# Patient Record
Sex: MALE | Race: WHITE | NOT HISPANIC OR LATINO | Employment: OTHER | URBAN - METROPOLITAN AREA
[De-identification: names, ages, dates, MRNs, and addresses within clinical notes are randomized per-mention and may not be internally consistent; named-entity substitution may affect disease eponyms.]

---

## 2018-11-06 ENCOUNTER — OFFICE VISIT - HEALTHEAST (OUTPATIENT)
Dept: GERIATRICS | Facility: CLINIC | Age: 78
End: 2018-11-06

## 2018-11-06 DIAGNOSIS — I10 ESSENTIAL HYPERTENSION: ICD-10-CM

## 2018-11-06 DIAGNOSIS — E78.5 HYPERLIPIDEMIA: ICD-10-CM

## 2018-11-06 DIAGNOSIS — D50.0 BLOOD LOSS ANEMIA: ICD-10-CM

## 2018-11-06 DIAGNOSIS — Z96.652 STATUS POST TOTAL LEFT KNEE REPLACEMENT: ICD-10-CM

## 2018-11-06 DIAGNOSIS — I25.10 ATHEROSCLEROTIC CARDIOVASCULAR DISEASE: ICD-10-CM

## 2018-11-06 DIAGNOSIS — F41.9 ANXIETY: ICD-10-CM

## 2018-11-07 ENCOUNTER — OFFICE VISIT - HEALTHEAST (OUTPATIENT)
Dept: GERIATRICS | Facility: CLINIC | Age: 78
End: 2018-11-07

## 2018-11-07 ENCOUNTER — RECORDS - HEALTHEAST (OUTPATIENT)
Dept: LAB | Facility: CLINIC | Age: 78
End: 2018-11-07

## 2018-11-07 DIAGNOSIS — D50.0 BLOOD LOSS ANEMIA: ICD-10-CM

## 2018-11-07 DIAGNOSIS — I95.9 HYPOTENSION: ICD-10-CM

## 2018-11-07 DIAGNOSIS — E78.5 HYPERLIPIDEMIA: ICD-10-CM

## 2018-11-07 DIAGNOSIS — I10 ESSENTIAL HYPERTENSION: ICD-10-CM

## 2018-11-07 DIAGNOSIS — Z96.652 STATUS POST TOTAL LEFT KNEE REPLACEMENT: ICD-10-CM

## 2018-11-07 LAB — HGB BLD-MCNC: 13.6 G/DL (ref 14–18)

## 2018-11-08 ENCOUNTER — AMBULATORY - HEALTHEAST (OUTPATIENT)
Dept: ADMINISTRATIVE | Facility: CLINIC | Age: 78
End: 2018-11-08

## 2018-11-08 RX ORDER — OXYCODONE HYDROCHLORIDE 5 MG/1
5 TABLET ORAL EVERY 8 HOURS PRN
Status: SHIPPED | COMMUNITY
Start: 2018-11-08

## 2018-11-08 RX ORDER — PRAVASTATIN SODIUM 40 MG
20 TABLET ORAL AT BEDTIME
Status: SHIPPED | COMMUNITY
Start: 2018-11-08

## 2018-11-08 RX ORDER — BISACODYL 10 MG
10 SUPPOSITORY, RECTAL RECTAL DAILY PRN
Status: SHIPPED | COMMUNITY
Start: 2018-11-08

## 2018-11-08 RX ORDER — ACETAMINOPHEN 325 MG/1
650 TABLET ORAL EVERY 4 HOURS PRN
Status: SHIPPED | COMMUNITY
Start: 2018-11-19

## 2018-11-08 RX ORDER — OMEPRAZOLE 20 MG/1
20 TABLET, DELAYED RELEASE ORAL
Status: SHIPPED | COMMUNITY
Start: 2018-11-08

## 2018-11-08 RX ORDER — METOPROLOL TARTRATE 25 MG/1
12.5 TABLET, FILM COATED ORAL 2 TIMES DAILY
Status: SHIPPED | COMMUNITY
Start: 2018-11-08

## 2018-11-08 RX ORDER — AMOXICILLIN 250 MG
2 CAPSULE ORAL 2 TIMES DAILY
Status: SHIPPED | COMMUNITY
Start: 2018-11-08

## 2018-11-10 ENCOUNTER — OFFICE VISIT - HEALTHEAST (OUTPATIENT)
Dept: GERIATRICS | Facility: CLINIC | Age: 78
End: 2018-11-10

## 2018-11-10 DIAGNOSIS — R60.0 PEDAL EDEMA: ICD-10-CM

## 2018-11-10 DIAGNOSIS — F32.A DEPRESSION: ICD-10-CM

## 2018-11-10 DIAGNOSIS — N40.0 BPH (BENIGN PROSTATIC HYPERPLASIA): ICD-10-CM

## 2018-11-10 DIAGNOSIS — M15.9 OSTEOARTHRITIS, GENERALIZED: ICD-10-CM

## 2018-11-10 DIAGNOSIS — I25.10 CORONARY ARTERY DISEASE: ICD-10-CM

## 2018-11-10 DIAGNOSIS — I95.1 ORTHOSTATIC HYPOTENSION: ICD-10-CM

## 2018-11-10 DIAGNOSIS — G47.30 SLEEP APNEA: ICD-10-CM

## 2018-11-10 DIAGNOSIS — Z96.652 STATUS POST TOTAL LEFT KNEE REPLACEMENT: ICD-10-CM

## 2018-11-10 ASSESSMENT — MIFFLIN-ST. JEOR: SCORE: 1294.59

## 2018-11-12 ENCOUNTER — RECORDS - HEALTHEAST (OUTPATIENT)
Dept: LAB | Facility: CLINIC | Age: 78
End: 2018-11-12

## 2018-11-12 LAB
ANION GAP SERPL CALCULATED.3IONS-SCNC: 9 MMOL/L (ref 5–18)
BASOPHILS # BLD AUTO: 0.1 THOU/UL (ref 0–0.2)
BASOPHILS NFR BLD AUTO: 1 % (ref 0–2)
BUN SERPL-MCNC: 13 MG/DL (ref 8–28)
CALCIUM SERPL-MCNC: 9.6 MG/DL (ref 8.5–10.5)
CHLORIDE BLD-SCNC: 93 MMOL/L (ref 98–107)
CO2 SERPL-SCNC: 28 MMOL/L (ref 22–31)
CREAT SERPL-MCNC: 0.71 MG/DL (ref 0.7–1.3)
EOSINOPHIL # BLD AUTO: 0.5 THOU/UL (ref 0–0.4)
EOSINOPHIL NFR BLD AUTO: 6 % (ref 0–6)
ERYTHROCYTE [DISTWIDTH] IN BLOOD BY AUTOMATED COUNT: 13.1 % (ref 11–14.5)
GFR SERPL CREATININE-BSD FRML MDRD: >60 ML/MIN/1.73M2
GLUCOSE BLD-MCNC: 98 MG/DL (ref 70–125)
HCT VFR BLD AUTO: 39.7 % (ref 40–54)
HGB BLD-MCNC: 13.1 G/DL (ref 14–18)
LYMPHOCYTES # BLD AUTO: 2.4 THOU/UL (ref 0.8–4.4)
LYMPHOCYTES NFR BLD AUTO: 28 % (ref 20–40)
MCH RBC QN AUTO: 31.3 PG (ref 27–34)
MCHC RBC AUTO-ENTMCNC: 33 G/DL (ref 32–36)
MCV RBC AUTO: 95 FL (ref 80–100)
MONOCYTES # BLD AUTO: 1 THOU/UL (ref 0–0.9)
MONOCYTES NFR BLD AUTO: 11 % (ref 2–10)
NEUTROPHILS # BLD AUTO: 4.7 THOU/UL (ref 2–7.7)
NEUTROPHILS NFR BLD AUTO: 55 % (ref 50–70)
PLATELET # BLD AUTO: 402 THOU/UL (ref 140–440)
PMV BLD AUTO: 10 FL (ref 8.5–12.5)
POTASSIUM BLD-SCNC: 4.4 MMOL/L (ref 3.5–5)
RBC # BLD AUTO: 4.19 MILL/UL (ref 4.4–6.2)
SODIUM SERPL-SCNC: 130 MMOL/L (ref 136–145)
TSH SERPL DL<=0.005 MIU/L-ACNC: 3.21 UIU/ML (ref 0.3–5)
WBC: 8.7 THOU/UL (ref 4–11)

## 2018-11-13 ENCOUNTER — OFFICE VISIT - HEALTHEAST (OUTPATIENT)
Dept: GERIATRICS | Facility: CLINIC | Age: 78
End: 2018-11-13

## 2018-11-13 DIAGNOSIS — K59.00 CONSTIPATION IN MALE: ICD-10-CM

## 2018-11-13 DIAGNOSIS — Z96.652 STATUS POST TOTAL LEFT KNEE REPLACEMENT: ICD-10-CM

## 2018-11-13 DIAGNOSIS — I10 ESSENTIAL HYPERTENSION: ICD-10-CM

## 2018-11-13 DIAGNOSIS — K21.9 GASTROESOPHAGEAL REFLUX DISEASE, ESOPHAGITIS PRESENCE NOT SPECIFIED: ICD-10-CM

## 2018-11-13 RX ORDER — CHLORAL HYDRATE 500 MG
2 CAPSULE ORAL DAILY
Status: SHIPPED | COMMUNITY
Start: 2018-11-13

## 2018-11-13 RX ORDER — MULTIPLE VITAMINS W/ MINERALS TAB 9MG-400MCG
1 TAB ORAL DAILY
Status: SHIPPED | COMMUNITY
Start: 2018-11-13

## 2018-11-14 ENCOUNTER — RECORDS - HEALTHEAST (OUTPATIENT)
Dept: LAB | Facility: CLINIC | Age: 78
End: 2018-11-14

## 2018-11-14 ENCOUNTER — OFFICE VISIT - HEALTHEAST (OUTPATIENT)
Dept: GERIATRICS | Facility: CLINIC | Age: 78
End: 2018-11-14

## 2018-11-14 DIAGNOSIS — E87.1 HYPONATREMIA: ICD-10-CM

## 2018-11-14 DIAGNOSIS — M79.605 LEFT LEG PAIN: ICD-10-CM

## 2018-11-14 LAB
ANION GAP SERPL CALCULATED.3IONS-SCNC: 9 MMOL/L (ref 5–18)
BUN SERPL-MCNC: 12 MG/DL (ref 8–28)
CALCIUM SERPL-MCNC: 9.6 MG/DL (ref 8.5–10.5)
CHLORIDE BLD-SCNC: 91 MMOL/L (ref 98–107)
CO2 SERPL-SCNC: 27 MMOL/L (ref 22–31)
CREAT SERPL-MCNC: 0.69 MG/DL (ref 0.7–1.3)
GFR SERPL CREATININE-BSD FRML MDRD: >60 ML/MIN/1.73M2
GLUCOSE BLD-MCNC: 105 MG/DL (ref 70–125)
POTASSIUM BLD-SCNC: 4.4 MMOL/L (ref 3.5–5)
SODIUM SERPL-SCNC: 127 MMOL/L (ref 136–145)

## 2018-11-15 ENCOUNTER — RECORDS - HEALTHEAST (OUTPATIENT)
Dept: LAB | Facility: CLINIC | Age: 78
End: 2018-11-15

## 2018-11-15 LAB
ANION GAP SERPL CALCULATED.3IONS-SCNC: 8 MMOL/L (ref 5–18)
BASOPHILS # BLD AUTO: 0.1 THOU/UL (ref 0–0.2)
BASOPHILS NFR BLD AUTO: 1 % (ref 0–2)
BUN SERPL-MCNC: 14 MG/DL (ref 8–28)
CALCIUM SERPL-MCNC: 9.8 MG/DL (ref 8.5–10.5)
CHLORIDE BLD-SCNC: 91 MMOL/L (ref 98–107)
CO2 SERPL-SCNC: 28 MMOL/L (ref 22–31)
CREAT SERPL-MCNC: 0.73 MG/DL (ref 0.7–1.3)
EOSINOPHIL # BLD AUTO: 0.3 THOU/UL (ref 0–0.4)
EOSINOPHIL NFR BLD AUTO: 4 % (ref 0–6)
ERYTHROCYTE [DISTWIDTH] IN BLOOD BY AUTOMATED COUNT: 13.2 % (ref 11–14.5)
GFR SERPL CREATININE-BSD FRML MDRD: >60 ML/MIN/1.73M2
GLUCOSE BLD-MCNC: 66 MG/DL (ref 70–125)
HCT VFR BLD AUTO: 37.5 % (ref 40–54)
HGB BLD-MCNC: 12.9 G/DL (ref 14–18)
LYMPHOCYTES # BLD AUTO: 1.5 THOU/UL (ref 0.8–4.4)
LYMPHOCYTES NFR BLD AUTO: 20 % (ref 20–40)
MCH RBC QN AUTO: 32.6 PG (ref 27–34)
MCHC RBC AUTO-ENTMCNC: 34.4 G/DL (ref 32–36)
MCV RBC AUTO: 95 FL (ref 80–100)
MONOCYTES # BLD AUTO: 1 THOU/UL (ref 0–0.9)
MONOCYTES NFR BLD AUTO: 13 % (ref 2–10)
NEUTROPHILS # BLD AUTO: 4.6 THOU/UL (ref 2–7.7)
NEUTROPHILS NFR BLD AUTO: 62 % (ref 50–70)
OSMOLALITY SERPL: 266 MOSM/KG (ref 270–300)
OSMOLALITY UR: 183 MOSM/KG (ref 300–900)
PLATELET # BLD AUTO: 438 THOU/UL (ref 140–440)
PMV BLD AUTO: 9.5 FL (ref 8.5–12.5)
POTASSIUM BLD-SCNC: 4.2 MMOL/L (ref 3.5–5)
RBC # BLD AUTO: 3.96 MILL/UL (ref 4.4–6.2)
SODIUM SERPL-SCNC: 127 MMOL/L (ref 136–145)
SODIUM UR-SCNC: 39 MMOL/L
WBC: 7.5 THOU/UL (ref 4–11)

## 2018-11-16 LAB
ANION GAP SERPL CALCULATED.3IONS-SCNC: 8 MMOL/L (ref 5–18)
BUN SERPL-MCNC: 11 MG/DL (ref 8–28)
CALCIUM SERPL-MCNC: 9.4 MG/DL (ref 8.5–10.5)
CHLORIDE BLD-SCNC: 94 MMOL/L (ref 98–107)
CO2 SERPL-SCNC: 27 MMOL/L (ref 22–31)
CREAT SERPL-MCNC: 0.7 MG/DL (ref 0.7–1.3)
GFR SERPL CREATININE-BSD FRML MDRD: >60 ML/MIN/1.73M2
GLUCOSE BLD-MCNC: 99 MG/DL (ref 70–125)
POTASSIUM BLD-SCNC: 4.1 MMOL/L (ref 3.5–5)
SODIUM SERPL-SCNC: 129 MMOL/L (ref 136–145)

## 2018-11-20 ENCOUNTER — OFFICE VISIT - HEALTHEAST (OUTPATIENT)
Dept: GERIATRICS | Facility: CLINIC | Age: 78
End: 2018-11-20

## 2018-11-20 DIAGNOSIS — I10 ESSENTIAL HYPERTENSION: ICD-10-CM

## 2018-11-20 DIAGNOSIS — K21.9 GASTROESOPHAGEAL REFLUX DISEASE, ESOPHAGITIS PRESENCE NOT SPECIFIED: ICD-10-CM

## 2018-11-20 DIAGNOSIS — Z96.652 STATUS POST TOTAL LEFT KNEE REPLACEMENT: ICD-10-CM

## 2018-11-20 DIAGNOSIS — F41.9 ANXIETY: ICD-10-CM

## 2018-11-20 RX ORDER — SODIUM CHLORIDE 1 G/1
1 TABLET ORAL 2 TIMES DAILY
Status: SHIPPED | COMMUNITY
Start: 2018-11-20

## 2018-11-21 ENCOUNTER — AMBULATORY - HEALTHEAST (OUTPATIENT)
Dept: GERIATRICS | Facility: CLINIC | Age: 78
End: 2018-11-21

## 2021-06-02 VITALS — WEIGHT: 138 LBS | HEIGHT: 67 IN | BODY MASS INDEX: 21.66 KG/M2

## 2021-06-02 VITALS — BODY MASS INDEX: 21.61 KG/M2 | WEIGHT: 138 LBS

## 2021-06-02 VITALS — WEIGHT: 138 LBS

## 2021-06-02 VITALS — WEIGHT: 138 LBS | BODY MASS INDEX: 21.61 KG/M2

## 2021-06-16 PROBLEM — R80.9 PROTEINURIA: Status: ACTIVE | Noted: 2018-11-06

## 2021-06-16 PROBLEM — J32.9 CHRONIC SINUSITIS: Status: ACTIVE | Noted: 2018-11-06

## 2021-06-16 PROBLEM — N40.1 BPH WITH OBSTRUCTION/LOWER URINARY TRACT SYMPTOMS: Status: ACTIVE | Noted: 2018-11-06

## 2021-06-16 PROBLEM — N13.8 BPH WITH OBSTRUCTION/LOWER URINARY TRACT SYMPTOMS: Status: ACTIVE | Noted: 2018-11-06

## 2021-06-16 PROBLEM — M48.07 SPINAL STENOSIS OF LUMBOSACRAL REGION: Status: ACTIVE | Noted: 2018-11-06

## 2021-06-16 PROBLEM — M54.50 LOW BACK PAIN: Status: ACTIVE | Noted: 2018-11-06

## 2021-06-16 PROBLEM — I69.314 FRONTAL LOBE AND EXECUTIVE FUNCTION DEFICIT FOLLOWING CEREBRAL INFARCTION: Status: ACTIVE | Noted: 2018-11-06

## 2021-06-16 PROBLEM — I10 ESSENTIAL HYPERTENSION: Status: ACTIVE | Noted: 2018-11-06

## 2021-06-16 PROBLEM — F41.9 ANXIETY: Status: ACTIVE | Noted: 2018-11-06

## 2021-06-16 PROBLEM — I25.10 ATHEROSCLEROTIC CARDIOVASCULAR DISEASE: Status: ACTIVE | Noted: 2018-11-06

## 2021-06-16 PROBLEM — Z96.652 STATUS POST TOTAL LEFT KNEE REPLACEMENT: Status: ACTIVE | Noted: 2018-11-06

## 2021-06-16 PROBLEM — Q39.6 DIVERTICULUM OF ESOPHAGUS: Status: ACTIVE | Noted: 2018-11-06

## 2021-06-16 PROBLEM — D50.0 BLOOD LOSS ANEMIA: Status: ACTIVE | Noted: 2018-11-06

## 2021-06-16 PROBLEM — K21.9 GERD (GASTROESOPHAGEAL REFLUX DISEASE): Status: ACTIVE | Noted: 2018-11-06

## 2021-06-16 PROBLEM — E78.5 HYPERLIPIDEMIA: Status: ACTIVE | Noted: 2018-11-06

## 2021-06-21 NOTE — PROGRESS NOTES
[facility]    Facility:   [facility] [[id number]]   Code Status: FULL CODE      CHIEF COMPLAINT/REASON FOR VISIT:  Chief Complaint   Patient presents with     Review Of Multiple Medical Conditions       HISTORY:      HPI: Jared is a 78 y.o. male undergoing physical and occupational therapy at [facility].  He is  with a past medical history for HLD, HTN, BPH, GERD, CAD, CVA, OA recently admitted to the [facility] 11/1 to 11/5/2018 for a planned L. TKA due to  OA and persistent pain with ambulation.    He did have some blood loss anemia and Hgb stabilized to 11.8 on 11/4.  and today 11/6/18 he was 13.6. He was discharged on apixaban until POD 5 then to start ASA 81mg x 9 additional days.  He will follow up with Orthopedics on 11/14/18    Today he was seen as a follow up after a 2 day hospital stay at [facility]. He was recently sent to the ER for reports of feeling  dizzy and fuzzy in the head. He also experienced a low BP.  Today he reports feeling much better. He denied CP or shortness of breath. His labs were reviewed and TSH within normal limits. He did report feeling very cold.. He was noted to have a low NA of 130 and this  will be rechecked.  During his hospitalization his metoprolol was decreased to 12.5 two times a day.  He had a positive D- dimer but CTA was negative. It is reported he undergo a heart monitor if symptoms persist. His main concern today  was no BM x 3 days. He reports he is  starting to having abdominal discomfort. Abdomen was soft and he is passing flatus, also BS x4.  He was placed on bowel medications.     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  Social History     Socioeconomic History     Marital status:      Spouse name: Not on file     Number of children: Not on file     Years of education: Not on file     Highest education level: Not on file   Social Needs     Financial resource strain: Not on file     Food insecurity - worry: Not on file     Food insecurity - inability: Not on file     Transportation needs - medical: Not on file     Transportation needs - non-medical: Not on file   Occupational History     Not on file   Tobacco Use     Smoking status: Not on file   Substance and Sexual Activity     Alcohol use: Not on file     Drug use: Not on file     Sexual activity: Not on file   Other Topics Concern     Not on file   Social History Narrative     Not on file         Review of Systems   Constitutional: Positive for activity change and fatigue. Negative for appetite change, chills and fever.   HENT: Negative for congestion and sore throat.    Eyes: Negative for visual disturbance.   Respiratory: Negative for cough, shortness of breath and wheezing.    Cardiovascular: Negative for chest pain and leg swelling.   Gastrointestinal: Positive for constipation. Negative for abdominal distention, abdominal pain and nausea.   Genitourinary: Negative for dysuria.   Musculoskeletal: Positive for arthralgias. Negative for myalgias.   Skin: Positive for wound. Negative for color change and rash.   Neurological: Negative for weakness and numbness.   Psychiatric/Behavioral: Negative for agitation, behavioral problems and sleep disturbance.       .  Vitals:    11/13/18 1128   BP: 140/63   Pulse: 67   Resp: 18   Temp: 97.6  F (36.4  C)   SpO2: 97%   Weight: 138 lb (62.6 kg)        Physical Exam   Constitutional: He is oriented to person, place, and time. He appears well-developed and well-nourished.   HENT:   Head: Normocephalic.   Eyes: Conjunctivae are normal.   Neck: Normal range of motion.   Cardiovascular: Normal rate, regular rhythm and normal heart sounds.   No murmur heard.  Regular and 67   Pulmonary/Chest: Breath sounds normal. No respiratory distress. He has no wheezes. He has no rales.   Abdominal: Soft. Bowel sounds are normal. He exhibits no distension. There is no tenderness.   Musculoskeletal: Normal range of motion. He exhibits no edema.   Neurological: He is alert and oriented to person, place, and time.   Skin: Skin is warm and dry.   Left knee wound- covered with dressing - no surrounding redness or drainage- follow up with Orthopedics on 11/14/18    Psychiatric: He has a normal mood and affect. His behavior is normal.         LABS:   Recent Results (from the past 240 hour(s))   Hemoglobin   Result Value Ref Range    Hemoglobin 13.6 (L) 14.0 - 18.0 g/dL   Basic Metabolic Panel   Result Value Ref Range    Sodium 130 (L) 136 - 145 mmol/L    Potassium 4.4 3.5 - 5.0 mmol/L    Chloride 93 (L) 98 - 107 mmol/L    CO2 28 22 - 31 mmol/L    Anion Gap, Calculation 9 5 - 18 mmol/L    Glucose 98 70 - 125 mg/dL    Calcium 9.6 8.5 - 10.5 mg/dL    BUN 13 8 - 28 mg/dL    Creatinine 0.71 0.70 - 1.30 mg/dL    GFR MDRD Af Amer >60 >60 mL/min/1.73m2    GFR MDRD Non Af Amer >60 >60 mL/min/1.73m2   Thyroid Stimulating Hormone (TSH)   Result Value Ref Range    TSH 3.21 0.30 - 5.00 uIU/mL   HM1 (CBC with Diff)   Result Value Ref Range    WBC 8.7 4.0 - 11.0 thou/uL    RBC 4.19 (L) 4.40 - 6.20 mill/uL    Hemoglobin 13.1 (L) 14.0 - 18.0 g/dL    Hematocrit 39.7 (L) 40.0 - 54.0 %    MCV 95 80 - 100 fL    MCH 31.3 27.0 - 34.0 pg    MCHC 33.0 32.0 - 36.0 g/dL    RDW 13.1 11.0 - 14.5 %    Platelets 402 140 - 440 thou/uL    MPV 10.0 8.5 - 12.5 fL    Neutrophils % 55 50 - 70 %    Lymphocytes % 28 20 -  40 %    Monocytes % 11 (H) 2 - 10 %    Eosinophils % 6 0 - 6 %    Basophils % 1 0 - 2 %    Neutrophils Absolute 4.7 2.0 - 7.7 thou/uL    Lymphocytes Absolute 2.4 0.8 - 4.4 thou/uL    Monocytes Absolute 1.0 (H) 0.0 - 0.9 thou/uL    Eosinophils Absolute 0.5 (H) 0.0 - 0.4 thou/uL    Basophils Absolute 0.1 0.0 - 0.2 thou/uL     Current Outpatient Medications   Medication Sig     [START ON 11/19/2018] acetaminophen (TYLENOL) 325 MG tablet Take 650 mg by mouth every 6 (six) hours as needed for pain.     acetaminophen (TYLENOL) 500 MG tablet Take 1,000 mg by mouth 3 (three) times a day.     aspirin 81 MG EC tablet Take 162 mg by mouth daily.     [START ON 11/16/2018] aspirin 81 MG EC tablet Take 81 mg by mouth daily.     b complex vitamins tablet Take 1 tablet by mouth daily.     bisacodyl (DULCOLAX) 10 mg suppository Insert 10 mg into the rectum daily as needed.     cholecalciferol, vitamin D3, 1,000 unit tablet Take 1,000 Units by mouth daily.     co-enzyme Q-10 30 mg capsule Take 30 mg by mouth 3 (three) times a day.     cranberry 400 mg cap Take 400 mg by mouth daily.     garlic 100 mg Tab Take 100 mg by mouth.     metoprolol tartrate (LOPRESSOR) 25 MG tablet Take 12.5 mg by mouth 2 (two) times a day hold for HR <60 &bp 110 SBP<60 DBP .           multivitamin with minerals (THERA-M) 9 mg iron-400 mcg Tab tablet Take 1 tablet by mouth daily.     naloxone (NARCAN) 4 mg/actuation nasal spray 4 mg by Intranasal route as needed for opioid reversal. 1 spray (4 mg dose) into one nostril for opioid reversal. Call 911. May repeat if no response in 3 minutes.     omega-3/dha/epa/fish oil (FISH OIL-OMEGA-3 FATTY ACIDS) 300-1,000 mg capsule Take 2 g by mouth daily.     omeprazole (PRILOSEC OTC) 20 MG tablet Take 20 mg by mouth daily before breakfast.     oxyCODONE (ROXICODONE) 5 MG immediate release tablet Take 2.5-5 mg by mouth every 8 (eight) hours as needed for pain .           pravastatin (PRAVACHOL) 40 MG tablet Take 20 mg by  mouth at bedtime .           selenium 50 mcg Tab Take 100 mcg by mouth daily with lunch.     senna-docusate (PERICOLACE) 8.6-50 mg tablet Take 2 tablets by mouth 2 (two) times a day.     UNABLE TO FIND Med Name:glucosamine 1500  1 tablet daily .     UNABLE TO FIND Med Name:hyaluric acid capsule 400-80-40 One time daily .     UNABLE TO FIND Med Name:Tumeric 500 mg Daily .     UNABLE TO FIND Med Name:MSM tablet  Po daily 1000mg. (methysulfonymethaqne) .       ASSESSMENT:      ICD-10-CM    1. Essential hypertension I10    2. Gastroesophageal reflux disease, esophagitis presence not specified K21.9    3. Status post total left knee replacement Z96.652    4. Constipation in male K59.00        PLAN:    Anemia Last HGB 13.1  HTN -BP  Stable metoprolol decreased to 12.5 two times a day in the hospital  CAD- on pravastatin   Physical deconditioning - PT/OT  S/P Left TKA- follow up tomorrow with Orthopedics, monitor for S/S infection   Recommended heart monitor if symptoms persist.   Constipation- bowel medications as ordered.     Electronically signed by: Polina Mcdonald, CNP

## 2021-06-21 NOTE — PROGRESS NOTES
"[facility]    Facility:   [facility] [[id number]]   Code Status: FULL CODE      CHIEF COMPLAINT/REASON FOR VISIT:  Chief Complaint   Patient presents with     Problem Visit       HISTORY:      HPI: Jared is a 78 y.o. male undergoing physical and occupational therapy at [facility].  He is  with a past medical history for HLD, HTN, BPH, GERD, CAD, CVA, OA recently admitted to the [facility] 11/1 to 11/5/2018 for a planned L. TKA due to  OA and persistent pain with ambulation.    He did have some blood loss anemia and Hgb stabilized to 11.8 on 11/4.  and today 11/6/18 he was 13.6. He was discharged on apixaban until POD 5 then to start ASA 81mg x 9 additional days.  He is to follow up with ortho for staple removal in 2 weeks.     Today he was seen in the dining room as the nurse was assessing him. He reported feeling dizzy and fuzzy in the head. He denied CP but reported his chest did not feel right. He reported it \"feels slow\" His sats were  Between 87-88% on RA. . His BP the nurse took with machine was 102/34. I checked him manually and was 88/38 with an apical pulse of 58. He also reported feeling cold. He was wheeled to his room and sent to the ER for further evaluation. Upon the paramedics assessment his BP was 120/80 but he did fluctuate on his oxygen. He was placed on 2L NC. He also reported having diarrhea x 2 days. He was noted to have a fading bruise left lateral eye and tells me he bumped it a few days ago but could not give further details.      No past medical history on file.          No family history on file.  Social History     Social History     Marital status:      Spouse name: N/A     Number of children: N/A     Years of education: N/A     Social History Main Topics     Smoking status: Not on file     Smokeless tobacco: Not on file     Alcohol use Not on file     Drug use: Not on file     Sexual activity: Not on file     Other Topics " Concern     Not on file     Social History Narrative         Review of Systems   Constitutional: Positive for activity change and fatigue. Negative for appetite change, chills and fever.   HENT: Negative for congestion and sore throat.    Eyes: Negative for visual disturbance.   Respiratory: Negative for cough, shortness of breath and wheezing.    Cardiovascular: Positive for palpitations. Negative for chest pain and leg swelling.   Gastrointestinal: Positive for diarrhea. Negative for abdominal distention, abdominal pain, constipation and nausea.   Genitourinary: Negative for dysuria.   Musculoskeletal: Positive for arthralgias. Negative for myalgias.   Skin: Positive for wound. Negative for color change and rash.   Neurological: Positive for dizziness and light-headedness. Negative for weakness and numbness.   Psychiatric/Behavioral: Negative for agitation, behavioral problems and sleep disturbance.       .  Vitals:    11/07/18 0944   BP: 137/67   Pulse: 65   Resp: 18   Temp: (!) 96.4  F (35.8  C)   SpO2: 98%   Weight: 138 lb (62.6 kg)       Physical Exam   Constitutional: He is oriented to person, place, and time. He appears well-developed and well-nourished.   HENT:   Head: Normocephalic.   Eyes: Conjunctivae are normal.   Neck: Normal range of motion.   Cardiovascular: Normal rate, regular rhythm and normal heart sounds.    No murmur heard.  Irregularly irregular with a pulse of 58   Pulmonary/Chest: Breath sounds normal. No respiratory distress. He has no wheezes. He has no rales.   Abdominal: Soft. Bowel sounds are normal. He exhibits no distension. There is no tenderness.   Musculoskeletal: Normal range of motion. He exhibits no edema.   Neurological: He is alert and oriented to person, place, and time.   Skin: Skin is warm and dry.   Left knee wound- not assessed during this time.    Psychiatric: He has a normal mood and affect. His behavior is normal.         LABS:   Recent Results (from the past 240  hour(s))   Hemoglobin   Result Value Ref Range    Hemoglobin 13.6 (L) 14.0 - 18.0 g/dL     No current outpatient prescriptions on file.       ASSESSMENT:      ICD-10-CM    1. Status post total left knee replacement Z96.652    2. Hyperlipidemia E78.5    3. Hypotension I95.9    4. Essential hypertension I10    5. Blood loss anemia D50.0        PLAN:    Anemia HGB 11/7/18 -13.6  HTN BP low metoprolol with hold parameters.   CAD- on pravastatin   Send to the ER for further evaluation    Electronically signed by: Polina Mcdonald CNP

## 2021-06-21 NOTE — PROGRESS NOTES
[facility]    Facility:   [facility] [[id number]]   Code Status: FULL CODE  PCP: No Primary Care Provider   Phone: None   Fax: None      CHIEF COMPLAINT/REASON FOR VISIT:  Chief Complaint   Patient presents with     Discharge Summary       HISTORY COURSE:   Jared is a 78 y.o. male undergoing physical and occupational therapy at [facility].  He is  with a past medical history for HLD, HTN, BPH, GERD, CAD, CVA, OA recently admitted to the [facility] 11/1 to 11/5/2018 for a planned L. TKA due to  OA and persistent pain with ambulation.    He did have some blood loss anemia and Hgb stabilized to 11.8 on 11/4.  and  11/6/18 he was 13.6. He was discharged on apixaban until POD 5 then to start ASA 81mg x 9 additional days.  He followed up with Orthopedics on 11/14/18     Today he was seen as a follow up for a routine medical visit and a face to face for discharge. He was recently sent to the ER for reports of feeling  dizzy and fuzzy in the head. He also experienced a low BP.  Today he reports feeling much better. He denied CP or shortness of breath. His labs were reviewed and TSH within normal limits. . He was noted to have a low NA of 130. Last reading 129 on 11/16/18. Pt recently started on Na chloride tablets two times a day. Orders written to follow up with PMD for hyponatremia. He denied muscle cramps, fatigue or HA     During his hospitalization his metoprolol was decreased to 12.5 two times a day.  He had a positive D- dimer but CTA was negative. It is reported he undergo a heart monitor if symptoms persist. He reports he now moving his bowels. He declined home care care services and will do outpatient therapy      Review of Systems  Constitutional: Positive for activity change and fatigue. Negative for appetite change, chills and fever.   HENT: Negative for congestion and sore throat.    Eyes: Negative for visual disturbance.   Respiratory: Negative for cough,  shortness of breath and wheezing.    Cardiovascular: Negative for chest pain and leg swelling.   Gastrointestinal: negative for constipation. Negative for abdominal distention, abdominal pain and nausea.   Genitourinary: Negative for dysuria.   Musculoskeletal: Positive for arthralgias. Negative for myalgias.   Skin: Positive for wound. Negative for color change and rash.   Neurological: Negative for weakness and numbness.   Psychiatric/Behavioral: Negative for agitation, behavioral problems and sleep disturbance.       Vitals:    11/20/18 1054   BP: 127/69   Pulse: 76   Resp: 20   Temp: (!) 96.3  F (35.7  C)   SpO2: 98%   Weight: 138 lb (62.6 kg)       Physical Exam  Constitutional: He is oriented to person, place, and time. He appears well-developed and well-nourished.   HENT:   Head: Normocephalic.   Eyes: Conjunctivae are normal.   Neck: Normal range of motion.   Cardiovascular: Normal rate, regular rhythm and normal heart sounds.   No murmur heard.  Regular    Pulmonary/Chest: Breath sounds normal. No respiratory distress. He has no wheezes. He has no rales.   Abdominal: Soft. Bowel sounds are normal. He exhibits no distension. There is no tenderness.   Musculoskeletal: Normal range of motion. He exhibits no edema.   Neurological: He is alert and oriented to person, place, and time.   Skin: Skin is warm and dry.   Left knee wound- steri strips - no surrounding redness or drainage- pt seen by ortho on 11/14. No lower extremity edema.   Psychiatric: He has a normal mood and affect. His behavior is normal.       MEDICATION LIST:  Current Outpatient Medications   Medication Sig     acetaminophen (TYLENOL) 325 MG tablet Take 650 mg by mouth every 4 (four) hours as needed for pain .           aspirin 81 MG EC tablet Take 81 mg by mouth daily.     b complex vitamins tablet Take 1 tablet by mouth daily.     bisacodyl (DULCOLAX) 10 mg suppository Insert 10 mg into the rectum daily as needed.     cholecalciferol, vitamin  D3, 1,000 unit tablet Take 1,000 Units by mouth daily.     co-enzyme Q-10 30 mg capsule Take 30 mg by mouth 3 (three) times a day.     cranberry 400 mg cap Take 400 mg by mouth daily.     garlic 100 mg Tab Take 100 mg by mouth.     metoprolol tartrate (LOPRESSOR) 25 MG tablet Take 12.5 mg by mouth 2 (two) times a day hold for HR <60 &bp 110 SBP<60 DBP .           multivitamin with minerals (THERA-M) 9 mg iron-400 mcg Tab tablet Take 1 tablet by mouth daily.     naloxone (NARCAN) 4 mg/actuation nasal spray 4 mg by Intranasal route as needed for opioid reversal. 1 spray (4 mg dose) into one nostril for opioid reversal. Call 911. May repeat if no response in 3 minutes.     omega-3/dha/epa/fish oil (FISH OIL-OMEGA-3 FATTY ACIDS) 300-1,000 mg capsule Take 2 g by mouth daily.     omeprazole (PRILOSEC OTC) 20 MG tablet Take 20 mg by mouth daily before breakfast.     oxyCODONE (ROXICODONE) 5 MG immediate release tablet Take 2.5-5 mg by mouth every 8 (eight) hours as needed for pain .           pravastatin (PRAVACHOL) 40 MG tablet Take 20 mg by mouth at bedtime .           selenium 50 mcg Tab Take 100 mcg by mouth daily with lunch.     senna-docusate (PERICOLACE) 8.6-50 mg tablet Take 2 tablets by mouth 2 (two) times a day.     UNABLE TO FIND Med Name:glucosamine 1500  1 tablet daily .     UNABLE TO FIND Med Name:hyaluric acid capsule 400-80-40 One time daily .     UNABLE TO FIND Med Name:Tumeric 500 mg Daily .     UNABLE TO FIND Med Name:MSM tablet  Po daily 1000mg. (methysulfonymethaqne) .       DISCHARGE DIAGNOSIS:    ICD-10-CM    1. Status post total left knee replacement Z96.652    2. Gastroesophageal reflux disease, esophagitis presence not specified K21.9    3. Essential hypertension I10    4. Anxiety F41.9        MEDICAL EQUIPMENT NEEDS:  None     DISCHARGE PLAN/FACE TO FACE:  I certify that services are/were furnished while this patient was under the care of a physician and that a physician or an allowed  non-physician practitioner (NPP), had a face-to-face encounter that meets the physician face-to-face encounter requirements. The encounter was in whole, or in part, related to the primary reason for home health. The patient is confined to his/her home and needs intermittent skilled nursing, physical therapy, speech-language pathology, or the continued need for occupational therapy. A plan of care has been established by a physician and is periodically reviewed by a physician.  Date of Face-to-Face Encounter:11/20/18    I certify that, based on my findings, the following services are medically necessary home health services: N/A outpatient therapy    My clinical findings support the need for the above skilled services because: N/A pt will do outpatient therapy     This patient is homebound because: N/A pt will do outpatient therapy    The patient is, or has been, under my care and I have initiated the establishment of the plan of care. This patient will be followed by a physician who will periodically review the plan of care.    Schedule follow up visit with primary care provider within 7 days to reestablish care.    Electronically signed by: Polina Mcdonald CNP

## 2021-06-21 NOTE — PROGRESS NOTES
Code Status:  FULL CODE  Visit Type: Follow Up     Facility:  [facility] [[id number]]        Facility Type: SNF (Skilled Nursing Facility, TCU)    History of Present Illness: Jared Clancy is a 78 y.o. male with a past medical history for HLD, HTN, BPH, GERD, CAD, CVA, OA recently admitted to the [facility] 11/1 to 11/5/2018 for a planned L. TKA 2/2 OA and persistent pain with ambulation.  [facility] was uneventful.  He did have some blood loss anemia and Hgb stabilized to 11.8 on 11/4.  He was discharged on apixaban until POD 5 then to start ASA 81mg x 9 additional days.  He is to follow up with ortho for staple removal in 2 weeks.     Today, patient is seen for a TCU f/u visit.  He reports his pain is well controlled and is trying to only use tylenol. Does have an oxycodone order.  He is able to easily move in bed to a sitting position.  His knee is swollen without redness.  The incision is covered with an occlusive dressing which is to be left on until ortho visit. His CMS is good without s/s of DVT.  His BP is elevated 150s-175/40-60s and is only on Metoprolol 12.5 two times a day.  His HR is in the 60s but upon exam is very irregular.  He reports feelings of palpitations starting yesterday. He denies any dizziness, lightheadness or chest pain. He does have a cardiac history but unsure if he has a history of arrhythmias.  He is not sure.  He reports a mass on his thyroid which [facility]. He also states he plans to have back surgery after rehabing the knee.     Review of Systems    Patient denies fever, chills, headache, lightheadedness, dizziness, rhinorrhea, cough, congestion, shortness of breath, chest pain, palpitations, abdominal pain, n/v, diarrhea, constipation, change in appetite, dysuria, frequency, burning or pain with urination.  Otherwise review of systems are negative.       Physical Exam   Vital signs: /65, HR 65, resp: 18,  98%, 138lbs   GENERAL APPEARANCE: Well developed, well nourished, in no acute distress.  HEENT: normocephalic, atraumatic  PERRL, sclerae anicteric, conjunctivae pink and moist, EOM intact  External inspection of ears and nose showed no scars, lesions or masses.  Lips, teeth, and gums showed normal mucosa.   NECK: Supple and symmetric. Trachea is midline, no thyromegaly, no adenopathy, and no tenderness  LUNGS: Lung sounds CTA, no adventitious sounds, respiratory effort normal.  CARD: RRR, S1, S2, without murmurs, gallops, rubs, no JVD, peripheral pulses 2+ and symmetric  ABD: Soft and nontender with normal bowel sounds.   MSK: Muscle strength and tone were normal.  EXTREMITIES: No cyanosis, clubbing or edema.  NEURO: Alert and oriented x 3. Normal affect. Sensation to touch was normal. Face is symmetric.  SKIN: Inspection of the skin reveals no rashes, ulcerations or petechiae.  PSYCH: euthymic        Labs:  All labs reviewed in the nursing home record.    Assessment:  1. Status post total left knee replacement     2. Essential hypertension     3. Blood loss anemia     4. Anxiety     5. Hyperlipidemia     6. Atherosclerotic cardiovascular disease         Plan:   TKA: stable, continue same plan of care, f/u ortho x 2 weeks  HTN: hypertension; increase Metoprolol 25mg two times a day hold for HR<60, and /60  Anemia: check Hgb this week  HLD: continue pravastatin  Palpitations: EKG       Glenda DIOP CNP,saw and examined the patient and case discussed with Polina Mcdonald CNP.  Polina DIOP CNP, I examined the patient with Glenda Wallace and discussed and agree with the medical care given.    Electronically signed by: Glenda Wallace CNP
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
## 2021-06-21 NOTE — PROGRESS NOTES
Code Status:  FULL CODE  Visit Type: Problem Visit     Facility:  [facility] [[id number]]        Facility Type: SNF (Skilled Nursing Facility, TCU)    History of Present Illness: Jared Hidalgo is a 78 y.o. male who I am seeing today for followup of hyponatremia and left leg pain. Per patient, he has a longstanding history of left sided sciatica. He says that his pain is usually controlled with epidural steroid injection. He has not been getting this recently however due to his left TKA. He states that up until this morning his pain was well controlled. He notes that during physical therapy he tweaked something and is now having shooting pain down the left leg. Patient also noting significant left LE edema. He has had this since his surgery. It is non-painful and non-erythematous.     Patient is denying any headache, dizziness, chest pain, or shortness of breath.     Review of Systems   10pt review of systems is negative unless noted above  Physical Exam  /65   Pulse (!) 50   Temp (!) 96.2  F (35.7  C)   Resp 18   SpO2 100%   General: Well appearing, NAD, answering questions appropriately  CV: S1, S2, RRR. No murmurs, clicks or rubs  RESP: Clear to auscultation bilaterally  ABD: Soft, NT, ND, BS+  EXT: 2+ pitting edema in the left lower extremity up to the knee, nonerythematous. Nontender. No edema in the right lower extremity  Neuro: 5/5 lower extremity str bilaterally. 2+ bilateral DTRs. Negative straight leg raise bilaterally.     Labs:     Results for JARED HIDALGO (MRN [id number]) as of 11/14/2018 12:31   Ref. Range 11/12/2018 06:50 11/14/2018 06:00   Sodium Latest Ref Range: 136 - 145 mmol/L 130 (L) 127 (L)     Assessment:  1. Hyponatremia     2. Left leg pain         Plan:   Unclear source of hyponatremia, he did not have this while hospitalized at [facility]. Not having any headaches or vision changes at this time. Will start salt tabs 1g two times a day. Repeat BMP on  11/16/18. Will draw serum osmolality, urine sodium, and urine osmolality.    Leg pain is possibly secondary to MSK strain vs sciatica. Symptoms are more like sciatica, however exam does not support this. Will increase oxycodone to 5mg q8h PRN for moderate pain. Instructed patient to elevate left leg above the level of the heart when laying down. Start compression stocking on left lower extremity. Continue icing the knee four times a day.       25 minutes spent of which greater than 65% was face to face communication with the patient about above plan of care  Patient was discussed, chart reviewed and examined with third-year family practice resident Dr. Yoel Ramirez agree with his assessment and plan. Gary Stark MD    Patient seen and staffed with Dr. Gary Stark who agrees with this assessment and plan.     Electronically signed by: Yoel Ramirez MD

## 2025-03-18 ENCOUNTER — LAB REQUISITION (OUTPATIENT)
Dept: LAB | Facility: CLINIC | Age: 85
End: 2025-03-18
Payer: COMMERCIAL

## 2025-03-18 DIAGNOSIS — N13.8 OTHER OBSTRUCTIVE AND REFLUX UROPATHY: ICD-10-CM

## 2025-03-18 DIAGNOSIS — Z71.89 OTHER SPECIFIED COUNSELING: ICD-10-CM

## 2025-03-18 DIAGNOSIS — F03.B0 UNSPECIFIED DEMENTIA, MODERATE, WITHOUT BEHAVIORAL DISTURBANCE, PSYCHOTIC DISTURBANCE, MOOD DISTURBANCE, AND ANXIETY (H): ICD-10-CM

## 2025-03-18 DIAGNOSIS — N40.1 BENIGN PROSTATIC HYPERPLASIA WITH LOWER URINARY TRACT SYMPTOMS: ICD-10-CM

## 2025-03-18 DIAGNOSIS — I25.810 ATHEROSCLEROSIS OF CORONARY ARTERY BYPASS GRAFT(S) WITHOUT ANGINA PECTORIS: ICD-10-CM

## 2025-03-18 DIAGNOSIS — M15.0 PRIMARY GENERALIZED (OSTEO)ARTHRITIS: ICD-10-CM

## 2025-03-18 DIAGNOSIS — K21.9 GASTRO-ESOPHAGEAL REFLUX DISEASE WITHOUT ESOPHAGITIS: ICD-10-CM

## 2025-03-25 LAB
ANION GAP SERPL CALCULATED.3IONS-SCNC: 12 MMOL/L (ref 7–15)
BUN SERPL-MCNC: 18.7 MG/DL (ref 8–23)
CALCIUM SERPL-MCNC: 9.6 MG/DL (ref 8.8–10.4)
CHLORIDE SERPL-SCNC: 106 MMOL/L (ref 98–107)
CHOLEST SERPL-MCNC: 161 MG/DL
CREAT SERPL-MCNC: 0.9 MG/DL (ref 0.67–1.17)
EGFRCR SERPLBLD CKD-EPI 2021: 84 ML/MIN/1.73M2
ERYTHROCYTE [DISTWIDTH] IN BLOOD BY AUTOMATED COUNT: 13.6 % (ref 10–15)
FASTING STATUS PATIENT QL REPORTED: NORMAL
GLUCOSE SERPL-MCNC: 68 MG/DL (ref 70–99)
HCO3 SERPL-SCNC: 25 MMOL/L (ref 22–29)
HCT VFR BLD AUTO: 41.7 % (ref 40–53)
HDLC SERPL-MCNC: 47 MG/DL
HGB BLD-MCNC: 13.7 G/DL (ref 13.3–17.7)
LDLC SERPL CALC-MCNC: 89 MG/DL
MCH RBC QN AUTO: 32.5 PG (ref 26.5–33)
MCHC RBC AUTO-ENTMCNC: 32.9 G/DL (ref 31.5–36.5)
MCV RBC AUTO: 99 FL (ref 78–100)
NONHDLC SERPL-MCNC: 114 MG/DL
PLATELET # BLD AUTO: 240 10E3/UL (ref 150–450)
POTASSIUM SERPL-SCNC: 4.1 MMOL/L (ref 3.4–5.3)
RBC # BLD AUTO: 4.21 10E6/UL (ref 4.4–5.9)
SODIUM SERPL-SCNC: 143 MMOL/L (ref 135–145)
TRIGL SERPL-MCNC: 124 MG/DL
TSH SERPL DL<=0.005 MIU/L-ACNC: 1.52 UIU/ML (ref 0.3–4.2)
WBC # BLD AUTO: 6.8 10E3/UL (ref 4–11)

## 2025-03-25 PROCEDURE — 80048 BASIC METABOLIC PNL TOTAL CA: CPT | Mod: ORL | Performed by: NURSE PRACTITIONER

## 2025-03-25 PROCEDURE — P9604 ONE-WAY ALLOW PRORATED TRIP: HCPCS | Mod: ORL | Performed by: NURSE PRACTITIONER

## 2025-03-25 PROCEDURE — 80061 LIPID PANEL: CPT | Mod: ORL | Performed by: NURSE PRACTITIONER

## 2025-03-25 PROCEDURE — 36415 COLL VENOUS BLD VENIPUNCTURE: CPT | Mod: ORL | Performed by: NURSE PRACTITIONER

## 2025-03-25 PROCEDURE — 85027 COMPLETE CBC AUTOMATED: CPT | Mod: ORL | Performed by: NURSE PRACTITIONER

## 2025-03-25 PROCEDURE — 84443 ASSAY THYROID STIM HORMONE: CPT | Mod: ORL | Performed by: NURSE PRACTITIONER
